# Patient Record
Sex: MALE | Race: WHITE | Employment: FULL TIME | URBAN - METROPOLITAN AREA
[De-identification: names, ages, dates, MRNs, and addresses within clinical notes are randomized per-mention and may not be internally consistent; named-entity substitution may affect disease eponyms.]

---

## 2018-01-10 ENCOUNTER — OFFICE VISIT (OUTPATIENT)
Dept: URGENT CARE | Facility: URGENT CARE | Age: 48
End: 2018-01-10
Payer: COMMERCIAL

## 2018-01-10 VITALS
HEART RATE: 98 BPM | SYSTOLIC BLOOD PRESSURE: 133 MMHG | WEIGHT: 208 LBS | TEMPERATURE: 97.1 F | BODY MASS INDEX: 30.7 KG/M2 | DIASTOLIC BLOOD PRESSURE: 84 MMHG | OXYGEN SATURATION: 99 %

## 2018-01-10 DIAGNOSIS — L03.221 CELLULITIS AND ABSCESS OF NECK: Primary | ICD-10-CM

## 2018-01-10 DIAGNOSIS — L02.11 CELLULITIS AND ABSCESS OF NECK: Primary | ICD-10-CM

## 2018-01-10 DIAGNOSIS — Z86.14 HISTORY OF MRSA INFECTION: ICD-10-CM

## 2018-01-10 PROCEDURE — 99214 OFFICE O/P EST MOD 30 MIN: CPT | Performed by: PHYSICIAN ASSISTANT

## 2018-01-10 ASSESSMENT — ENCOUNTER SYMPTOMS
HEMOPTYSIS: 0
DIAPHORESIS: 0
COUGH: 0
CARDIOVASCULAR NEGATIVE: 1
PALPITATIONS: 0
EYE PAIN: 0
RESPIRATORY NEGATIVE: 1
FEVER: 0
CONSTITUTIONAL NEGATIVE: 1
WEIGHT LOSS: 0
GASTROINTESTINAL NEGATIVE: 1

## 2018-01-10 NOTE — MR AVS SNAPSHOT
"              After Visit Summary   1/10/2018    Cory Judd    MRN: [id number]           Patient Information     Date Of Birth          1970        Visit Information        Provider Department      1/10/2018 7:25 PM Jenna Villagomez PA-C [facility]        Today's Diagnoses     Cellulitis and abscess of neck    -  1    History of MRSA infection           Follow-ups after your visit        Follow-up notes from your care team     Return if symptoms worsen or fail to improve.      Who to contact     If you have questions or need follow up information about today's clinic visit or your schedule please contact [facility] directly at [phone].  Normal or non-critical lab and imaging results will be communicated to you by MyChart, letter or phone within 4 business days after the clinic has received the results. If you do not hear from us within 7 days, please contact the clinic through MyChart or phone. If you have a critical or abnormal lab result, we will notify you by phone as soon as possible.  Submit refill requests through NoDays[employer] or call your pharmacy and they will forward the refill request to us. Please allow 3 business days for your refill to be completed.          Additional Information About Your Visit        MyChart Information     NoDaysOff lets you send messages to your doctor, view your test results, renew your prescriptions, schedule appointments and more. To sign up, go to [email or web address] . Click on \"Log in\" on the left side of the screen, which will take you to the Welcome page. Then click on \"Sign up Now\" on the right side of the page.     You will be asked to enter the access code listed below, as well as some personal information. Please follow the directions to create your username and password.     Your access code is: [email or web address]  Expires: 4/10/2018 10:13 PM     Your access code will  in 90 days. If you need help or a new code, please call your " [facility] or [phone].        Care EveryWhere ID     This is your Care EveryWhere ID. This could be used by other organizations to access your [facility] medical records  [id number]        Your Vitals Were     Pulse Temperature Pulse Oximetry BMI (Body Mass Index)          98 97.1  F (36.2  C) (Oral) 99% 30.7 kg/m2         Blood Pressure from Last 3 Encounters:   01/10/18 133/84   12/08/16 100/74   06/01/10 114/64    Weight from Last 3 Encounters:   01/10/18 208 lb (94.3 kg)   12/08/16 215 lb (97.5 kg)   06/01/10 174 lb 3.2 oz (79 kg)              Today, you had the following     No orders found for display       Primary Care Provider Office Phone # Fax #    Malik Joe Cook [phone]       [address] [place]            [place] [address]        Equal Access to Services     ANSELMO CENTENO AH: Hadii trixie ku hadasho Soomaali, waaxda luqadaha, qaybta kaalmada adeegyada, alireza rockwell . So [facility] [phone].    ATENCIÓN: Si habla español, tiene a álvarez disposición servicios gratuitos de asistencia lingüística. Llame al [phone].    We comply with applicable federal civil rights laws and [place] laws. We do not discriminate on the basis of race, color, national origin, age, disability, sex, sexual orientation, or gender identity.            Thank you!     [facility] you [facility]. Our goal is always to provide you with excellent care. Hearing back from our patients is one way we can continue to improve our services. Please take a few minutes to complete the written survey that you may receive in the mail after your visit with us. Thank you!             Your Updated Medication List - Protect others around you: Learn how to safely use, store and throw away your medicines at www.disposemymeds.org.      Notice  As of 1/10/2018 10:13 PM    You have not been prescribed any medications.

## 2018-01-11 NOTE — PROGRESS NOTES
SUBJECTIVE:                                                         HPI  Cory Judd is a 47 year old male who presents to clinic today for the following health issues:  Neck mass     Duration: 5 days.  Developed mass over his anterior neck which seem to be increasing in size and pain.     Description (location/character/radiation): anterior neck    Intensity:  moderate    Accompanying signs and symptoms:  Describes red, tender, and swollen mass over his anterior neck which feels similar to his previous MRSA infections in the past.  No drainage.  No trauma or injuries. Feels like mass is restricting his breathing and swallowing.  No cough, shortness of breath or wheezing.  No abdominal pain, n/v.  No fever, chills or sweats.  No difficulty swallowing.  No HA,  dizziness, numbness or tingling.      History (similar episodes/previous evaluation): reports hx of MRSA infection to his face and head requiring hospitalization in the past.    Precipitating or alleviating factors: None.  No one else in the family with similar lesions.      Therapies tried and outcome: warm compresses without any relief.       Reviewed PMH.  Patient Active Problem List   Diagnosis     Degeneration of lumbar or lumbosacral intervertebral disc     Cervicalgia     Narcotic addiction (H)     CARDIOVASCULAR SCREENING; LDL GOAL LESS THAN 160     Opiate dependence (H)     Alcohol dependence (H)     No current outpatient prescriptions on file.     Allergies   Allergen Reactions     Hydrocodone Itching and Nausea     No Known Drug Allergies        Review of Systems   Constitutional: Negative.  Negative for diaphoresis, fever and weight loss.   HENT: Negative.    Eyes: Negative for pain.   Respiratory: Negative.  Negative for cough and hemoptysis.    Cardiovascular: Negative.  Negative for chest pain and palpitations.   Gastrointestinal: Negative.    Skin: Negative.    All other systems reviewed and are negative.      /84  Pulse 98   Temp 97.1  F (36.2  C) (Oral)  Wt 208 lb (94.3 kg)  SpO2 99%  BMI 30.7 kg/m2  Physical Exam   Constitutional: He is oriented to person, place, and time and well-developed, well-nourished, and in no distress. No distress.   HENT:   Head: Normocephalic and atraumatic.   Nose: Nose normal.   Mouth/Throat: Uvula is midline, oropharynx is clear and moist and mucous membranes are normal. No oropharyngeal exudate, posterior oropharyngeal edema, posterior oropharyngeal erythema or tonsillar abscesses.   TMs are intact without any erythema or bulging bilaterally.  Airway is patent.   Eyes: Conjunctivae and EOM are normal. Pupils are equal, round, and reactive to light. No scleral icterus.   Neck: Normal range of motion and full passive range of motion without pain. Neck supple. No spinous process tenderness and no muscular tenderness present. No rigidity. No edema, no erythema and normal range of motion present. No Brudzinski's sign and no Kernig's sign noted. No thyromegaly present.       Cardiovascular: Normal rate, regular rhythm, normal heart sounds and intact distal pulses.  Exam reveals no gallop and no friction rub.    No murmur heard.  Pulmonary/Chest: Effort normal and breath sounds normal. No respiratory distress. He has no wheezes. He has no rales.   Lymphadenopathy:        Head (right side): No tonsillar adenopathy present.        Head (left side): No tonsillar adenopathy present.     He has no cervical adenopathy.   Neurological: He is alert and oriented to person, place, and time. He has intact cranial nerves.   Skin: Skin is warm, dry and intact. No rash noted.   Distal pulses are 2+ and symmetric.  No peripheral edema.   Psychiatric: Mood and affect normal.   Nursing note and vitals reviewed.        Assessment/Plan:  Cellulitis and abscess of neck:  Most likely secondary to MRSA infection which he has had in the past.  He states that this feels similar to his previous MRSA infection in the past as well.   He also feels like this is restricting his breathing and swallowing.  At this point, I recommended further evaluation and management in the ER.  Due to the size and location of this abscess, this would be more appropriately managed in the ER.  Will most likely need I&D and/or IV abxs.  Patient has declined transportation via ambulance and will have family drive him.  Understands risks and benefits of ambulance transfer and he has declined.  He is stable in clinic with normal VS.  Call 911 if worsening symptoms.  [facility] has [facility] notified.  F/u with PCP after ER visit.     History of MRSA infection          Jennacatalina Villagomez PA-C    Chart reviewed.  Encounter was reviewed with provider after the visit.  Patient was not examined by me.  Elva Donnelly M.D.

## 2019-06-13 ENCOUNTER — TELEPHONE (OUTPATIENT)
Dept: FAMILY MEDICINE | Facility: CLINIC | Age: 49
End: 2019-06-13

## 2019-06-13 NOTE — TELEPHONE ENCOUNTER
Panel Management Review   One phone call and send letter if unable to reach them or [employer] message and send letter if not read after 2 weeks (You will get a message to your inbasket)      Visit date not found    Health Maintenance Due   Topic Date Due     PREVENTIVE CARE VISIT  1970     HIV SCREENING  07/25/1986     LIPID  07/25/2006     PHQ-2  01/01/2019        Fail List measure: BMI        Patient is due/failing the following:   BMI    Action needed:   Patient needs office visit for Preventative Visit.    Type of outreach:    Wrong Number    Questions for provider review:    None                                                                             Brittanie Lerner